# Patient Record
Sex: FEMALE | Race: WHITE | NOT HISPANIC OR LATINO | URBAN - METROPOLITAN AREA
[De-identification: names, ages, dates, MRNs, and addresses within clinical notes are randomized per-mention and may not be internally consistent; named-entity substitution may affect disease eponyms.]

---

## 2017-11-24 ENCOUNTER — OUTPATIENT (OUTPATIENT)
Dept: OUTPATIENT SERVICES | Facility: HOSPITAL | Age: 67
LOS: 1 days | Discharge: HOME | End: 2017-11-24

## 2017-11-24 DIAGNOSIS — M79.673 PAIN IN UNSPECIFIED FOOT: ICD-10-CM

## 2017-11-24 DIAGNOSIS — M54.5 LOW BACK PAIN: ICD-10-CM

## 2017-11-24 DIAGNOSIS — M79.643 PAIN IN UNSPECIFIED HAND: ICD-10-CM

## 2017-11-24 DIAGNOSIS — M25.579 PAIN IN UNSPECIFIED ANKLE AND JOINTS OF UNSPECIFIED FOOT: ICD-10-CM

## 2021-08-05 ENCOUNTER — EMERGENCY (EMERGENCY)
Facility: HOSPITAL | Age: 71
LOS: 0 days | Discharge: LEFT BEFORE DISCHARGE | End: 2021-08-05
Attending: EMERGENCY MEDICINE | Admitting: EMERGENCY MEDICINE
Payer: MEDICARE

## 2021-08-05 VITALS
HEART RATE: 82 BPM | TEMPERATURE: 98 F | DIASTOLIC BLOOD PRESSURE: 70 MMHG | OXYGEN SATURATION: 99 % | SYSTOLIC BLOOD PRESSURE: 136 MMHG | RESPIRATION RATE: 18 BRPM

## 2021-08-05 VITALS
HEART RATE: 79 BPM | OXYGEN SATURATION: 98 % | RESPIRATION RATE: 18 BRPM | SYSTOLIC BLOOD PRESSURE: 141 MMHG | TEMPERATURE: 97 F | DIASTOLIC BLOOD PRESSURE: 82 MMHG

## 2021-08-05 DIAGNOSIS — R11.0 NAUSEA: ICD-10-CM

## 2021-08-05 DIAGNOSIS — R07.89 OTHER CHEST PAIN: ICD-10-CM

## 2021-08-05 DIAGNOSIS — R20.8 OTHER DISTURBANCES OF SKIN SENSATION: ICD-10-CM

## 2021-08-05 DIAGNOSIS — I11.9 HYPERTENSIVE HEART DISEASE WITHOUT HEART FAILURE: ICD-10-CM

## 2021-08-05 DIAGNOSIS — Z87.39 PERSONAL HISTORY OF OTHER DISEASES OF THE MUSCULOSKELETAL SYSTEM AND CONNECTIVE TISSUE: ICD-10-CM

## 2021-08-05 DIAGNOSIS — J45.909 UNSPECIFIED ASTHMA, UNCOMPLICATED: ICD-10-CM

## 2021-08-05 DIAGNOSIS — M54.6 PAIN IN THORACIC SPINE: ICD-10-CM

## 2021-08-05 DIAGNOSIS — R07.9 CHEST PAIN, UNSPECIFIED: ICD-10-CM

## 2021-08-05 LAB
ALBUMIN SERPL ELPH-MCNC: 4.2 G/DL — SIGNIFICANT CHANGE UP (ref 3.5–5.2)
ALP SERPL-CCNC: 104 U/L — SIGNIFICANT CHANGE UP (ref 30–115)
ALT FLD-CCNC: 12 U/L — SIGNIFICANT CHANGE UP (ref 0–41)
ANION GAP SERPL CALC-SCNC: 10 MMOL/L — SIGNIFICANT CHANGE UP (ref 7–14)
ANISOCYTOSIS BLD QL: SIGNIFICANT CHANGE UP
AST SERPL-CCNC: 25 U/L — SIGNIFICANT CHANGE UP (ref 0–41)
BASE EXCESS BLDV CALC-SCNC: 3.7 MMOL/L — HIGH (ref -2–2)
BASOPHILS # BLD AUTO: 0.07 K/UL — SIGNIFICANT CHANGE UP (ref 0–0.2)
BASOPHILS NFR BLD AUTO: 0.9 % — SIGNIFICANT CHANGE UP (ref 0–1)
BILIRUB SERPL-MCNC: 0.5 MG/DL — SIGNIFICANT CHANGE UP (ref 0.2–1.2)
BUN SERPL-MCNC: 12 MG/DL — SIGNIFICANT CHANGE UP (ref 10–20)
CA-I SERPL-SCNC: 1.22 MMOL/L — SIGNIFICANT CHANGE UP (ref 1.12–1.3)
CALCIUM SERPL-MCNC: 9.6 MG/DL — SIGNIFICANT CHANGE UP (ref 8.5–10.1)
CHLORIDE SERPL-SCNC: 103 MMOL/L — SIGNIFICANT CHANGE UP (ref 98–110)
CO2 SERPL-SCNC: 24 MMOL/L — SIGNIFICANT CHANGE UP (ref 17–32)
CREAT SERPL-MCNC: 0.8 MG/DL — SIGNIFICANT CHANGE UP (ref 0.7–1.5)
D DIMER BLD IA.RAPID-MCNC: 148 NG/ML DDU — SIGNIFICANT CHANGE UP (ref 0–230)
EOSINOPHIL # BLD AUTO: 0.14 K/UL — SIGNIFICANT CHANGE UP (ref 0–0.7)
EOSINOPHIL NFR BLD AUTO: 1.7 % — SIGNIFICANT CHANGE UP (ref 0–8)
GAS PNL BLDV: 142 MMOL/L — SIGNIFICANT CHANGE UP (ref 136–145)
GAS PNL BLDV: SIGNIFICANT CHANGE UP
GIANT PLATELETS BLD QL SMEAR: PRESENT — SIGNIFICANT CHANGE UP
GLUCOSE SERPL-MCNC: 95 MG/DL — SIGNIFICANT CHANGE UP (ref 70–99)
HCO3 BLDV-SCNC: 30 MMOL/L — HIGH (ref 22–29)
HCT VFR BLD CALC: 36.1 % — LOW (ref 37–47)
HCT VFR BLDA CALC: 32.5 % — LOW (ref 34–44)
HGB BLD CALC-MCNC: 10.6 G/DL — LOW (ref 14–18)
HGB BLD-MCNC: 10.6 G/DL — LOW (ref 12–16)
HYPOCHROMIA BLD QL: SIGNIFICANT CHANGE UP
LACTATE BLDV-MCNC: 1.5 MMOL/L — SIGNIFICANT CHANGE UP (ref 0.5–1.6)
LYMPHOCYTES # BLD AUTO: 1.38 K/UL — SIGNIFICANT CHANGE UP (ref 1.2–3.4)
LYMPHOCYTES # BLD AUTO: 16.7 % — LOW (ref 20.5–51.1)
MCHC RBC-ENTMCNC: 19.2 PG — LOW (ref 27–31)
MCHC RBC-ENTMCNC: 29.4 G/DL — LOW (ref 32–37)
MCV RBC AUTO: 65.3 FL — LOW (ref 81–99)
MICROCYTES BLD QL: SIGNIFICANT CHANGE UP
MONOCYTES # BLD AUTO: 0.44 K/UL — SIGNIFICANT CHANGE UP (ref 0.1–0.6)
MONOCYTES NFR BLD AUTO: 5.3 % — SIGNIFICANT CHANGE UP (ref 1.7–9.3)
NEUTROPHILS # BLD AUTO: 6.23 K/UL — SIGNIFICANT CHANGE UP (ref 1.4–6.5)
NEUTROPHILS NFR BLD AUTO: 75.4 % — HIGH (ref 42.2–75.2)
NEUTS HYPERSEG # BLD: PRESENT — SIGNIFICANT CHANGE UP
NT-PROBNP SERPL-SCNC: 102 PG/ML — SIGNIFICANT CHANGE UP (ref 0–300)
OVALOCYTES BLD QL SMEAR: SLIGHT — SIGNIFICANT CHANGE UP
PCO2 BLDV: 54 MMHG — HIGH (ref 41–51)
PH BLDV: 7.36 — SIGNIFICANT CHANGE UP (ref 7.26–7.43)
PLAT MORPH BLD: NORMAL — SIGNIFICANT CHANGE UP
PLATELET # BLD AUTO: 313 K/UL — SIGNIFICANT CHANGE UP (ref 130–400)
PO2 BLDV: 29 MMHG — SIGNIFICANT CHANGE UP (ref 20–40)
POIKILOCYTOSIS BLD QL AUTO: SLIGHT — SIGNIFICANT CHANGE UP
POLYCHROMASIA BLD QL SMEAR: SIGNIFICANT CHANGE UP
POTASSIUM BLDV-SCNC: 4.5 MMOL/L — SIGNIFICANT CHANGE UP (ref 3.3–5.6)
POTASSIUM SERPL-MCNC: 5.1 MMOL/L — HIGH (ref 3.5–5)
POTASSIUM SERPL-SCNC: 5.1 MMOL/L — HIGH (ref 3.5–5)
PROT SERPL-MCNC: 7.1 G/DL — SIGNIFICANT CHANGE UP (ref 6–8)
RBC # BLD: 5.53 M/UL — HIGH (ref 4.2–5.4)
RBC # FLD: 15.7 % — HIGH (ref 11.5–14.5)
RBC BLD AUTO: ABNORMAL
SAO2 % BLDV: 50 % — SIGNIFICANT CHANGE UP
SODIUM SERPL-SCNC: 137 MMOL/L — SIGNIFICANT CHANGE UP (ref 135–146)
TROPONIN T SERPL-MCNC: <0.01 NG/ML — SIGNIFICANT CHANGE UP
WBC # BLD: 8.26 K/UL — SIGNIFICANT CHANGE UP (ref 4.8–10.8)
WBC # FLD AUTO: 8.26 K/UL — SIGNIFICANT CHANGE UP (ref 4.8–10.8)

## 2021-08-05 PROCEDURE — 93970 EXTREMITY STUDY: CPT | Mod: 26

## 2021-08-05 PROCEDURE — 93010 ELECTROCARDIOGRAM REPORT: CPT

## 2021-08-05 PROCEDURE — 99285 EMERGENCY DEPT VISIT HI MDM: CPT

## 2021-08-05 PROCEDURE — 71045 X-RAY EXAM CHEST 1 VIEW: CPT | Mod: 26

## 2021-08-05 NOTE — ED ADULT NURSE NOTE - OBJECTIVE STATEMENT
Patient stated she bent down this morning and had sudden onset of left sided cp that radiated down left arm . Patient denies and Cough SOB N/V

## 2021-08-05 NOTE — ED PROVIDER NOTE - CLINICAL SUMMARY MEDICAL DECISION MAKING FREE TEXT BOX
The patient wishes to leave against medical advice.  I have discussed the risks, benefits and alternatives (including the possibility of worsening of disease, pain, permanent disability, and/or death) with the patient and his/her family (if available).  The patient voices understanding of these risks, benefits, and alternatives and still wishes to sign out against medical advice.  The patient is awake, alert, oriented  x 3 and has demonstrated capacity to refuse/direct care.  I have advised the patient that they can and should return immediately should they develop any worse/different/additional symptoms, or if they change their mind and want to continue their care.  Shared decision making utilized and HEART score discussed.  Patient electing for outpatient management and will follow up immediately with cardiology.  Risk of MACE discussed and patient understands this risk. Strict return precautions discussed and patient aware they can return at any time for re-evaluation and possible admission. EKG and results discussed.

## 2021-08-05 NOTE — ED PROVIDER NOTE - PROGRESS NOTE DETAILS
ATTENDING NOTE: I personally evaluated the patient. I reviewed the Resident’s note (as assigned above), and agree with the findings and plan except as documented in my note.   70 y/o F with PMH of HTN, cardiomegaly,  asthma, osteoarthritis, rheumatoid arthritis, p/w CP; notes pain in upper back radiating to L chest, beginning at 8AM this morning while sitting, persistent CP in ED. Prior to onset, went out to get the newspaper then sat down to watch TV before developing the pain. Pt then had burning down the L arm and up L side of neck so called EMS. Notes nausea this AM when waking up which has since resolved. Denies DVT workup in the past for chronic RLE swelling. No family HX of DVTs, PEs. Had a nuclear stress test 10 years ago in [facility]. Pt has a colonoscopy scheduled. No fever, chills, n/v, sob, pleuritic cp, palpitations, diaphoresis, cough, ha/lh/dizziness, numbness/tingling, neck pain/ stiffness, abd pain, diarrhea, constipation, melena/brbpr, urinary symptoms, trauma, weakness, edema, calf pain/swelling/erythema, sick contacts, recent travel or rash.   Vital Signs: I have reviewed the initial vital signs. Constitutional: Non-toxic appearing pt sitting on stretcher in nad. Integumentary: No rash. ENT: MMM NECK: Supple, non-tender, no meningeal signs. Cardiovascular: RRR, radial pulses 2/4 b/l. No JVD. (+) Reproducible L chest wall TTP.Respiratory: BS present b/l, ctabl, no wheezing or crackles, good resp effort and excursion, good air exchange,  no accessory muscle use, no stridor. Gastrointestinal: BS present throughout all 4 quadrants, soft, nd, nt, no rebound tenderness or guarding, no cvat. Musculoskeletal: FROM, circumference of R leg >L leg, no calf pain/swelling/erythema. Neurologic: AAOx3, motor 5/5 and sensation intact throughout UE and LE ext, CN II-XII intact, No facial droop or slurring of speech. No focal deficits.  Plan for labs, EKG, CXR, US LE, reassess. ATTENDING NOTE: I personally evaluated the patient. I reviewed the Resident’s note (as assigned above), and agree with the findings and plan except as documented in my note.   72 y/o F with PMH of HTN, cardiomegaly,  asthma, osteoarthritis, rheumatoid arthritis, p/w CP; notes pain in upper back radiating to L chest, beginning at 8AM this morning while sitting, persistent CP in ED. Prior to onset, went out to get the newspaper then sat down to watch TV before developing the pain. Pt then had burning down the L arm so called EMS. Notes nausea this AM when waking up which has since resolved. Denies DVT workup in the past for chronic RLE swelling. No family HX of DVTs, PEs. Had a nuclear stress test 10 years ago in [facility]. Pt has a colonoscopy scheduled. Does not have a cardiologist. No fever, chills, n/v, sob, pleuritic cp, palpitations, diaphoresis, cough, ha/lh/dizziness, numbness/tingling, neck pain/ stiffness, abd pain, diarrhea, constipation, melena/brbpr, urinary symptoms, trauma, weakness, calf pain/swelling/erythema, sick contacts, recent travel or rash.   Vital Signs: I have reviewed the initial vital signs. Constitutional: Non-toxic appearing pt sitting on stretcher in nad. Integumentary: No rash. ENT: MMM NECK: Supple, non-tender, no meningeal signs. Cardiovascular: RRR, radial pulses 2/4 b/l. No JVD. (+) Reproducible L chest wall TTP.Respiratory: BS present b/l, ctabl, no wheezing or crackles, no accessory muscle use, no stridor. Gastrointestinal: BS present throughout all 4 quadrants, soft, nd, nt, no rebound tenderness or guarding, no cvat. Musculoskeletal: FROM, circumference of R leg >L leg, no calf pain/swelling/erythema. dp and pt pulses 2/4 b/l. Neurologic: AAOx3, motor 5/5 and sensation intact throughout UE and LE ext, CN II-XII intact, No facial droop or slurring of speech. No focal deficits.  Plan for labs, EKG, CXR, US LE, reassess. ED Attending VAN Colin  prelim duplex negative for DVT ED Attending VAN Colin  results reviewed with pt and son at bedside, discussed obs, pt and son report pt comfortable with no symptoms at this time and would like to follow up as outpt, understands risks  associated and able to recite them back, would like to follow up with pmd, cardio and her RA physician as out, copy of results provided, adamant about signing out ama, pt aaox3, competent, does not lack capacity, aware of signs and symptoms to return for, will follow up as discussed. Og : I had extensive discussion of risks and benefits of pursuing further cardiac workup with patient and any son Gaby Deal at bedside .  patient still electing to leave against medical advice. She is awake, alert, oriented and demonstrates full capacity and insight into illness. She is aware and encouraged to return immediately to ED or nearest ED if she decides to change mind regarding care or if patient experiences any new, worsening, or concerning symptoms. PAtient reports she will follow up with a cardiologist outpatient. Og : preliminary DVT study negative.

## 2021-08-05 NOTE — ED ADULT NURSE NOTE - NSIMPLEMENTINTERV_GEN_ALL_ED
Implemented All Fall with Harm Risk Interventions:  [place] to call system. Call bell, personal items and telephone within reach. Instruct patient to call for assistance. Room bathroom lighting operational. Non-slip footwear when patient is off stretcher. Physically safe environment: no spills, clutter or unnecessary equipment. Stretcher in lowest position, wheels locked, appropriate side rails in place. Provide visual cue, wrist band, yellow gown, etc. Monitor gait and stability. Monitor for mental status changes and reorient to person, place, and time. Review medications for side effects contributing to fall risk. Reinforce activity limits and safety measures with patient and family. Provide visual clues: red socks.

## 2021-08-05 NOTE — ED PROVIDER NOTE - PATIENT PORTAL LINK FT
You can access the FollowMyHealth Patient Portal offered by [facility] by registering at the following website: [email or web address]. By joining [employer]’s FollowMyHealth portal, you will also be able to view your health information using other applications (apps) compatible with our system.

## 2021-08-05 NOTE — ED PROVIDER NOTE - CARE PROVIDER_API CALL
Tanja Johnson)  Cardiovascular Disease; Internal Medicine  [address] [place] [address]  Phone: [phone]  Fax: [phone]  Follow Up Time: 1-3 Days

## 2021-08-05 NOTE — ED PROVIDER NOTE - OBJECTIVE STATEMENT
Pt is a 70 y/o F with PMH of HTN, cardiomegaly,  asthma, osteoarthritis, rheumatoid arthritis presenting w/ chest pain that started this AM @ 8am. Pt reports it started as a sharp pain in upper back radiating to L chest while sitting. Priot to this pt went out to get the newspaper then sat down in front of the TV whent he pain started . Pt then developed a burning sensation down the L arm so EMA was called. . Pt reports she had a nuclear stress test 10 years ago in [facility], which was normal. Denies a cardiologist. No SOb, no syncope.

## 2021-08-05 NOTE — ED PROVIDER NOTE - PHYSICAL EXAMINATION
CONSTITUTIONAL: Well-developed; well-nourished; in no acute distress.   SKIN: warm, dry  HEAD: Normocephalic; atraumatic.  EYES: PERRL, EOMI, normal sclera and conjunctiva   ENT: No nasal discharge; airway clear.  NECK: Supple; non tender.  CARD:  Regular rate and rhythm. no overlying skin changes , + mild TTP over L chest wall   RESP: NO inc WOB   ABD: soft ntnd  EXT: LLE > RLE - pt reports this is chronic     LYMPH: No acute cervical adenopathy.  NEURO: Alert, oriented, grossly unremarkable  PSYCH: Cooperative, appropriate.

## 2021-08-23 ENCOUNTER — OUTPATIENT (OUTPATIENT)
Dept: OUTPATIENT SERVICES | Facility: HOSPITAL | Age: 71
LOS: 1 days | Discharge: HOME | End: 2021-08-23

## 2021-08-23 DIAGNOSIS — Z11.59 ENCOUNTER FOR SCREENING FOR OTHER VIRAL DISEASES: ICD-10-CM

## 2021-08-26 ENCOUNTER — OUTPATIENT (OUTPATIENT)
Dept: OUTPATIENT SERVICES | Facility: HOSPITAL | Age: 71
LOS: 1 days | Discharge: HOME | End: 2021-08-26
Payer: MEDICARE

## 2021-08-26 VITALS
HEART RATE: 80 BPM | HEIGHT: 65 IN | WEIGHT: 244.93 LBS | TEMPERATURE: 97 F | SYSTOLIC BLOOD PRESSURE: 186 MMHG | DIASTOLIC BLOOD PRESSURE: 82 MMHG | RESPIRATION RATE: 20 BRPM

## 2021-08-26 VITALS — SYSTOLIC BLOOD PRESSURE: 153 MMHG | HEART RATE: 76 BPM | RESPIRATION RATE: 18 BRPM | DIASTOLIC BLOOD PRESSURE: 76 MMHG

## 2021-08-26 DIAGNOSIS — D21.9 BENIGN NEOPLASM OF CONNECTIVE AND OTHER SOFT TISSUE, UNSPECIFIED: Chronic | ICD-10-CM

## 2021-08-26 DIAGNOSIS — Z98.49 CATARACT EXTRACTION STATUS, UNSPECIFIED EYE: Chronic | ICD-10-CM

## 2021-08-26 PROCEDURE — 88305 TISSUE EXAM BY PATHOLOGIST: CPT | Mod: 26

## 2021-08-26 RX ORDER — ALBUTEROL 90 UG/1
0 AEROSOL, METERED ORAL
Qty: 0 | Refills: 0 | DISCHARGE

## 2021-08-26 RX ORDER — AMLODIPINE BESYLATE 2.5 MG/1
1 TABLET ORAL
Qty: 0 | Refills: 0 | DISCHARGE

## 2021-08-26 RX ORDER — LOSARTAN POTASSIUM 100 MG/1
1 TABLET, FILM COATED ORAL
Qty: 0 | Refills: 0 | DISCHARGE

## 2021-08-26 RX ORDER — ERGOCALCIFEROL 1.25 MG/1
1 CAPSULE ORAL
Qty: 0 | Refills: 0 | DISCHARGE

## 2021-08-27 LAB — SURGICAL PATHOLOGY STUDY: SIGNIFICANT CHANGE UP

## 2021-08-31 DIAGNOSIS — Z91.012 ALLERGY TO EGGS: ICD-10-CM

## 2021-08-31 DIAGNOSIS — M06.9 RHEUMATOID ARTHRITIS, UNSPECIFIED: ICD-10-CM

## 2021-08-31 DIAGNOSIS — I10 ESSENTIAL (PRIMARY) HYPERTENSION: ICD-10-CM

## 2021-08-31 DIAGNOSIS — Z88.1 ALLERGY STATUS TO OTHER ANTIBIOTIC AGENTS STATUS: ICD-10-CM

## 2021-08-31 DIAGNOSIS — J45.909 UNSPECIFIED ASTHMA, UNCOMPLICATED: ICD-10-CM

## 2021-08-31 DIAGNOSIS — Z12.11 ENCOUNTER FOR SCREENING FOR MALIGNANT NEOPLASM OF COLON: ICD-10-CM

## 2021-08-31 DIAGNOSIS — D12.2 BENIGN NEOPLASM OF ASCENDING COLON: ICD-10-CM

## 2021-08-31 DIAGNOSIS — Z88.0 ALLERGY STATUS TO PENICILLIN: ICD-10-CM

## 2021-08-31 DIAGNOSIS — Z88.9 ALLERGY STATUS TO UNSPECIFIED DRUGS, MEDICAMENTS AND BIOLOGICAL SUBSTANCES: ICD-10-CM

## 2021-08-31 DIAGNOSIS — K64.8 OTHER HEMORRHOIDS: ICD-10-CM

## 2021-08-31 DIAGNOSIS — Z88.2 ALLERGY STATUS TO SULFONAMIDES: ICD-10-CM

## 2021-08-31 DIAGNOSIS — D64.9 ANEMIA, UNSPECIFIED: ICD-10-CM

## 2021-08-31 DIAGNOSIS — D12.0 BENIGN NEOPLASM OF CECUM: ICD-10-CM

## 2021-08-31 DIAGNOSIS — K57.30 DIVERTICULOSIS OF LARGE INTESTINE WITHOUT PERFORATION OR ABSCESS WITHOUT BLEEDING: ICD-10-CM

## 2021-08-31 DIAGNOSIS — Z91.013 ALLERGY TO SEAFOOD: ICD-10-CM

## 2021-08-31 DIAGNOSIS — Z96.1 PRESENCE OF INTRAOCULAR LENS: ICD-10-CM

## 2022-02-14 NOTE — PRE-ANESTHESIA EVALUATION ADULT - RESPIRATORY RATE (BREATHS/MIN)
Patient would like a referral to PT faxed [facility] to [facility].    Fax number: [phone]   20

## 2022-04-05 ENCOUNTER — EMERGENCY (EMERGENCY)
Facility: HOSPITAL | Age: 72
LOS: 0 days | Discharge: HOME | End: 2022-04-05
Attending: EMERGENCY MEDICINE | Admitting: EMERGENCY MEDICINE
Payer: MEDICARE

## 2022-04-05 VITALS
HEIGHT: 65 IN | OXYGEN SATURATION: 93 % | DIASTOLIC BLOOD PRESSURE: 71 MMHG | WEIGHT: 240.08 LBS | TEMPERATURE: 101 F | SYSTOLIC BLOOD PRESSURE: 144 MMHG | HEART RATE: 102 BPM | RESPIRATION RATE: 22 BRPM

## 2022-04-05 VITALS — RESPIRATION RATE: 18 BRPM | OXYGEN SATURATION: 99 % | HEART RATE: 89 BPM

## 2022-04-05 DIAGNOSIS — Z88.0 ALLERGY STATUS TO PENICILLIN: ICD-10-CM

## 2022-04-05 DIAGNOSIS — I10 ESSENTIAL (PRIMARY) HYPERTENSION: ICD-10-CM

## 2022-04-05 DIAGNOSIS — D21.9 BENIGN NEOPLASM OF CONNECTIVE AND OTHER SOFT TISSUE, UNSPECIFIED: Chronic | ICD-10-CM

## 2022-04-05 DIAGNOSIS — M06.9 RHEUMATOID ARTHRITIS, UNSPECIFIED: ICD-10-CM

## 2022-04-05 DIAGNOSIS — R05.9 COUGH, UNSPECIFIED: ICD-10-CM

## 2022-04-05 DIAGNOSIS — D64.9 ANEMIA, UNSPECIFIED: ICD-10-CM

## 2022-04-05 DIAGNOSIS — Z88.2 ALLERGY STATUS TO SULFONAMIDES: ICD-10-CM

## 2022-04-05 DIAGNOSIS — R06.02 SHORTNESS OF BREATH: ICD-10-CM

## 2022-04-05 DIAGNOSIS — Z98.49 CATARACT EXTRACTION STATUS, UNSPECIFIED EYE: Chronic | ICD-10-CM

## 2022-04-05 DIAGNOSIS — Z91.012 ALLERGY TO EGGS: ICD-10-CM

## 2022-04-05 DIAGNOSIS — J45.901 UNSPECIFIED ASTHMA WITH (ACUTE) EXACERBATION: ICD-10-CM

## 2022-04-05 DIAGNOSIS — Z91.013 ALLERGY TO SEAFOOD: ICD-10-CM

## 2022-04-05 DIAGNOSIS — Z88.8 ALLERGY STATUS TO OTHER DRUGS, MEDICAMENTS AND BIOLOGICAL SUBSTANCES STATUS: ICD-10-CM

## 2022-04-05 DIAGNOSIS — U07.1 COVID-19: ICD-10-CM

## 2022-04-05 DIAGNOSIS — R07.0 PAIN IN THROAT: ICD-10-CM

## 2022-04-05 DIAGNOSIS — Z88.6 ALLERGY STATUS TO ANALGESIC AGENT: ICD-10-CM

## 2022-04-05 DIAGNOSIS — Z98.49 CATARACT EXTRACTION STATUS, UNSPECIFIED EYE: ICD-10-CM

## 2022-04-05 DIAGNOSIS — Z87.42 PERSONAL HISTORY OF OTHER DISEASES OF THE FEMALE GENITAL TRACT: ICD-10-CM

## 2022-04-05 DIAGNOSIS — Z88.1 ALLERGY STATUS TO OTHER ANTIBIOTIC AGENTS STATUS: ICD-10-CM

## 2022-04-05 PROBLEM — J45.909 UNSPECIFIED ASTHMA, UNCOMPLICATED: Chronic | Status: ACTIVE | Noted: 2021-08-26

## 2022-04-05 LAB
ALBUMIN SERPL ELPH-MCNC: 4.5 G/DL — SIGNIFICANT CHANGE UP (ref 3.5–5.2)
ALP SERPL-CCNC: 85 U/L — SIGNIFICANT CHANGE UP (ref 30–115)
ALT FLD-CCNC: 12 U/L — SIGNIFICANT CHANGE UP (ref 0–41)
ANION GAP SERPL CALC-SCNC: 13 MMOL/L — SIGNIFICANT CHANGE UP (ref 7–14)
AST SERPL-CCNC: 24 U/L — SIGNIFICANT CHANGE UP (ref 0–41)
BASOPHILS # BLD AUTO: 0.03 K/UL — SIGNIFICANT CHANGE UP (ref 0–0.2)
BASOPHILS NFR BLD AUTO: 0.3 % — SIGNIFICANT CHANGE UP (ref 0–1)
BILIRUB SERPL-MCNC: 0.5 MG/DL — SIGNIFICANT CHANGE UP (ref 0.2–1.2)
BUN SERPL-MCNC: 10 MG/DL — SIGNIFICANT CHANGE UP (ref 10–20)
CALCIUM SERPL-MCNC: 8.9 MG/DL — SIGNIFICANT CHANGE UP (ref 8.5–10.1)
CHLORIDE SERPL-SCNC: 97 MMOL/L — LOW (ref 98–110)
CO2 SERPL-SCNC: 24 MMOL/L — SIGNIFICANT CHANGE UP (ref 17–32)
CREAT SERPL-MCNC: 0.8 MG/DL — SIGNIFICANT CHANGE UP (ref 0.7–1.5)
EGFR: 79 ML/MIN/1.73M2 — SIGNIFICANT CHANGE UP
EOSINOPHIL # BLD AUTO: 0 K/UL — SIGNIFICANT CHANGE UP (ref 0–0.7)
EOSINOPHIL NFR BLD AUTO: 0 % — SIGNIFICANT CHANGE UP (ref 0–8)
GLUCOSE SERPL-MCNC: 125 MG/DL — HIGH (ref 70–99)
HCT VFR BLD CALC: 34.3 % — LOW (ref 37–47)
HGB BLD-MCNC: 10.3 G/DL — LOW (ref 12–16)
IMM GRANULOCYTES NFR BLD AUTO: 0.4 % — HIGH (ref 0.1–0.3)
LYMPHOCYTES # BLD AUTO: 0.93 K/UL — LOW (ref 1.2–3.4)
LYMPHOCYTES # BLD AUTO: 8.1 % — LOW (ref 20.5–51.1)
MCHC RBC-ENTMCNC: 19 PG — LOW (ref 27–31)
MCHC RBC-ENTMCNC: 30 G/DL — LOW (ref 32–37)
MCV RBC AUTO: 63.2 FL — LOW (ref 81–99)
MONOCYTES # BLD AUTO: 1.03 K/UL — HIGH (ref 0.1–0.6)
MONOCYTES NFR BLD AUTO: 9 % — SIGNIFICANT CHANGE UP (ref 1.7–9.3)
NEUTROPHILS # BLD AUTO: 9.41 K/UL — HIGH (ref 1.4–6.5)
NEUTROPHILS NFR BLD AUTO: 82.2 % — HIGH (ref 42.2–75.2)
NRBC # BLD: 0 /100 WBCS — SIGNIFICANT CHANGE UP (ref 0–0)
PLATELET # BLD AUTO: 281 K/UL — SIGNIFICANT CHANGE UP (ref 130–400)
POTASSIUM SERPL-MCNC: 4.5 MMOL/L — SIGNIFICANT CHANGE UP (ref 3.5–5)
POTASSIUM SERPL-SCNC: 4.5 MMOL/L — SIGNIFICANT CHANGE UP (ref 3.5–5)
PROT SERPL-MCNC: 7.1 G/DL — SIGNIFICANT CHANGE UP (ref 6–8)
RBC # BLD: 5.43 M/UL — HIGH (ref 4.2–5.4)
RBC # FLD: 15.6 % — HIGH (ref 11.5–14.5)
SARS-COV-2 RNA SPEC QL NAA+PROBE: DETECTED
SODIUM SERPL-SCNC: 134 MMOL/L — LOW (ref 135–146)
WBC # BLD: 11.45 K/UL — HIGH (ref 4.8–10.8)
WBC # FLD AUTO: 11.45 K/UL — HIGH (ref 4.8–10.8)

## 2022-04-05 PROCEDURE — 99285 EMERGENCY DEPT VISIT HI MDM: CPT

## 2022-04-05 PROCEDURE — 71045 X-RAY EXAM CHEST 1 VIEW: CPT | Mod: 26

## 2022-04-05 RX ORDER — IBUPROFEN 200 MG
600 TABLET ORAL ONCE
Refills: 0 | Status: COMPLETED | OUTPATIENT
Start: 2022-04-05 | End: 2022-04-05

## 2022-04-05 RX ORDER — IPRATROPIUM/ALBUTEROL SULFATE 18-103MCG
3 AEROSOL WITH ADAPTER (GRAM) INHALATION
Refills: 0 | Status: DISCONTINUED | OUTPATIENT
Start: 2022-04-05 | End: 2022-04-05

## 2022-04-05 RX ADMIN — Medication 600 MILLIGRAM(S): at 08:27

## 2022-04-05 RX ADMIN — Medication 3 MILLILITER(S): at 08:28

## 2022-04-05 RX ADMIN — Medication 125 MILLIGRAM(S): at 08:28

## 2022-04-05 NOTE — ED PROVIDER NOTE - OBJECTIVE STATEMENT
4-day history of cough laryngitis symptoms are moderate intensity not associated with shortness of breath aggravated by talking.  Mild alleviation with albuterol but symptoms persist.  She denies any chest pain increased leg swelling orthopnea. Patient had + covid test 2 days ago.

## 2022-04-05 NOTE — ED PROVIDER NOTE - NS ED ROS FT
Constitutional:  no fevers, no chills, no malaise  Eyes:  No visual changes  ENMT: +throat pain  Cardiac:  No chest pain  Respiratory:  +Cough  GI:  No nausea, vomiting, diarrhea or abdominal pain.  :  No dysuria, frequency or burning.  MS:  No back pain, no joint pain.  Neuro:  No headache, no dizziness, no change in mental status  Skin:  No skin rash  Except as documented in the HPI,  all other systems are negative

## 2022-04-05 NOTE — ED PROVIDER NOTE - NSFOLLOWUPINSTRUCTIONS_ED_ALL_ED_FT
Follow up with PMD and COVID clinic in 1-2 days.    Novel Coronavirus (COVID-19)  The Facts  What is a coronavirus?  Coronaviruses are a large family of viruses that cause illnesses ranging from the common cold  to more severe diseases such as Middle East Respiratory Syndrome (MERS) and Severe Acute  Respiratory Syndrome (SARS).  What is Novel Coronavirus (COVID-19)?  COVID-19 is a new strain of Coronavirus that has not been previously identified in humans. COVID-19  was identified in [place], [place], [place] in December 2019 (COVID-19). COVID-19 has  since been identified outside of [place], in a growing number of countries internationally, including  the [place].  Where can I find the most recent information about COVID-19?  The Centers for Disease Control and Prevention (CDC) is closely monitoring the outbreak caused by the  COVID-19. For the latest information about COVID- 19, visit the CDC website at  https://www.cdc.gov/coronavirus/index.html  How are coronaviruses spread?  Coronaviruses can be transmitted from person-to- person, usually after close contact with an infected person,  for example, in a household, workplace, or healthcare setting via droplets that become airborne after a cough  or sneeze by an affected person. These droplets can then infect a nearby person. It is likely transmission also  occurs by touching recently contaminated surfaces.  What are the symptoms of coronavirus infection?  It depends on the virus, but common signs include fever and/or respiratory symptoms such as  cough and shortness of breath. In more severe cases, infection can cause pneumonia, severe acute  respiratory syndrome, kidney failure and even death. Fortunately, most cases of COVID-19 have an  illness no different than the influenza “flu”. With a majority of these patients having mild symptoms  and overall mortality which appears to be not much different than the flu.  Is there a treatment for a COVID-19?  There is no specific treatment for disease caused by COVID-19. However, many of the symptoms can  be treated based on the patient’s clinical condition. Supportive care for infected persons can be highly  effective.  What can I do to protect myself?  Washing your hands, covering your cough, and disinfecting surfaces are the best precautionary  measures. It is also advisable to avoid close contact with anyone showing symptoms of respiratory  illness such as coughing and sneezing. Those with symptoms should wear a surgical mask when  around others.  What can I do to protect those around me?  If you have been identified as someone who may be infected with COVID-19, we recommend you  follow the self-isolation procedures outlined below to protect those around you and limit the spread  of this virus.   March 3, 2020  Recommendations for Patients Advised to Self-Isolate  for Possible COVID-19 Exposure  We recommend the below precautionary steps from now until 14 days from when you  returned from your travel or date of your last known possible contact:  - Do not go to work, school, or public areas. Avoid using public transportation, ride-sharing, or  taxis.  - As much as possible, separate yourself from other people in your home. If you can, you should  stay in a room and away from other people in your home. Also, you should use a separate  bathroom, if available.  - Wear the supplied mask whenever you are around other people.  - If you have a non-urgent medical appointment, please reschedule for a later date. If the  appointment is urgent, please call the healthcare provider and tell them that you are on selfisolation for possible COVID-19. This will help the healthcare provider’s office take steps to keep  other people from getting infected or exposed. If you can reschedule routine appointments, do  so.  - Wash your hands often with soap and water for at least 15 to 20 seconds or clean your hands  with an alcohol-based hand  that contains 60 to 95% alcohol, covering all surfaces of  your hands and rubbing them together until they feel dry. Soap and water should be used  preferentially if hands are visibly dirty.  - Cover your mouth and nose with a tissue when you cough or sneeze. Throw used tissues in a  lined trash can; immediately wash your hands.  - Avoid touching your eyes, nose, and mouth with your hands.  - Avoid sharing personal household items. You should not share dishes, drinking glasses, cups,  eating utensils, towels, or bedding with other people or pets in your home. After using these  items, they should be washed thoroughly with soap and water.  - Clean and disinfect all “high-touch” surfaces every day. High touch surfaces include counters,  tabletops, doorknobs, light switches, remote controls, bathroom fixtures, toilets, phones,  keyboards, tablets, and bedside tables. Also, clean any surfaces that may have blood, stool, or  body fluids on them.       If you develop worsening symptoms:  - If you develop worsening symptoms, such as severe shortness of breath, please call [phone] option #9. They will assist you in determining your next steps.  During your time on self-isolation do the following:  - Work from home if you are able to so.  - Limit social isolation by talking with friends and family on the phone or with face-time  - Talk with friends and relatives who don’t live with you about supporting each other if one  household has to be quarantined. For example, agree to drop groceries or other supplies at the  front door.  - Exercise and spend time outdoors away from others if able to do so.    Why didn’t I get tested for novel coronavirus (COVID-19)?  The number of available tests is very limited so strict rules exist for who is allowed to be tested.  [facility] has been authorized to perform testing and is currently working hard to be  able to start providing the test. Such testing is currently reserved for patients who have had  contact with someone infected with the virus, or those who are very sick a plus those who have  traveled to areas identified by the Centers for Disease Control and Prevention (CD) and will  require hospitalization.  What should I do now?  If you are well enough to be discharged home and are not in a high risk group to have  contracted the COVID-10, you should care for yourself at home exactly like you would if you  have Influenza “flu”. Follow all the standard guidelines about washing your hands, covering  your cough, etc.  You should return to the Emergency Department if you develop worse symptoms, trouble  breathing, chest pain, and/or a fever that doesn’t improve with over the counter  acetaminophen or ibuprofen.

## 2022-04-05 NOTE — ED PROVIDER NOTE - CLINICAL SUMMARY MEDICAL DECISION MAKING FREE TEXT BOX
evaluated for cough in the setting of covid and asthma. pulse ox was stable, given nebs and steroids with improvement of sytmps. cxr was nml. patient discharged in stable conditions and discused indications to return to the ED

## 2022-04-05 NOTE — ED ADULT TRIAGE NOTE - CHIEF COMPLAINT QUOTE
patient reports being covid + since sunday reports difficulty breathing. lung sounds clear patient noted to have bark like cough - patient using nebulizer at home with out relief

## 2022-04-05 NOTE — ED PROVIDER NOTE - PATIENT PORTAL LINK FT
You can access the FollowMyHealth Patient Portal offered by [facility] by registering at the following website: [email or web address]. By joining [employer]’s FollowMyHealth portal, you will also be able to view your health information using other applications (apps) compatible with our system.

## 2022-04-05 NOTE — ED ADULT NURSE NOTE - OBJECTIVE STATEMENT
pt came to ed for difficult breathing, + covid since sunday. has used nebulizer at home with no relief

## 2022-04-05 NOTE — ED PROVIDER NOTE - NS ED ATTENDING STATEMENT MOD
This was a shared visit with the JULIANNE. I reviewed and verified the documentation and independently performed the documented:

## 2022-04-05 NOTE — ED PROVIDER NOTE - PHYSICAL EXAMINATION
CONSTITUTIONAL: Well-developed; well-nourished; in no acute distress, nontoxic appearing  SKIN: skin exam is warm and dry,  HEAD: Normocephalic; atraumatic.  EYES: PERRL, 3 mm bilateral, no nystagmus, EOM intact; conjunctiva and sclera clear.  ENT: MMM, no nasal congestion  NECK: Supple; non tender.  ROM intact.  CARD: S1, S2 normal, no murmur  RESP: speaking full sentences however there is end expiratory wheezing bilaterally  ABD: soft; non-distended; non-tender. No Rebound, No guarding  EXT: 2+ edema in bilateral lower xt  NEURO: awake, alert, following commands, oriented, grossly unremarkable. No Focal deficits. GCS 15.   PSYCH: Cooperative, appropriate.

## 2022-04-05 NOTE — ED ADULT NURSE NOTE - NSIMPLEMENTINTERV_GEN_ALL_ED
Implemented All Fall with Harm Risk Interventions:  [place] to call system. Call bell, personal items and telephone within reach. Instruct patient to call for assistance. Room bathroom lighting operational. Non-slip footwear when patient is off stretcher. Physically safe environment: no spills, clutter or unnecessary equipment. Stretcher in lowest position, wheels locked, appropriate side rails in place. Provide visual cue, wrist band, yellow gown, etc. Monitor gait and stability. Monitor for mental status changes and reorient to person, place, and time. Review medications for side effects contributing to fall risk. Reinforce activity limits and safety measures with patient and family. Provide visual clues: red socks.

## 2022-04-05 NOTE — ED PROVIDER NOTE - NSFOLLOWUPCLINICS_GEN_ALL_ED_FT
[facility] [place] [address]  Phone: [phone]  Fax:

## 2022-04-05 NOTE — ED PROVIDER NOTE - ATTENDING CONTRIBUTION TO CARE
4-day history of cough in the setting of positive Covid test patient has a history of asthma symptoms with mild improvement with albuterol but not resolving.  On exam there is faint end expiratory wheezing bilaterally she speaking full sentences there is no tachypnea or respiratory distress.  Her resting pulse ox is 97 and with exertion 97 plan is to obtain chest x-ray lab work start on steroids given wheezing and DuoNebs

## 2022-04-05 NOTE — ED ADULT NURSE NOTE - CHPI ED NUR SYMPTOMS POS
BRIEF OPERATIVE NOTE    Date of Procedure: 11/4/2019   Preoperative Diagnosis: CHOLECYSTITIS  Postoperative Diagnosis: CHOLECYSTITIS    Procedure(s):  CHOLECYSTECTOMY LAPAROSCOPIC  Surgeon(s) and Role:     Wili See MD - Primary         Surgical Staff:  Circ-1: Manjinder Kaufman RN  Scrub Tech-1: Kimmy Alfaro  Surg Asst-1: Micah LORA  Event Time In Time Out   Incision Start 1724    Incision Close       Anesthesia: General   Estimated Blood Loss: 10cc  Specimens:   ID Type Source Tests Collected by Time Destination   1 : Gallbladder Fresh Abdomen  Mona Mcknight MD 11/4/2019 9673 Pathology      Findings: inflamed distended GB   Complications: none  Implants: * No implants in log * SHORTNESS OF BREATH

## 2022-06-30 NOTE — ED ADULT TRIAGE NOTE - BMI (KG/M2)
06/30/22      Dale Wong  [address] [place] [id number]    Dear Dale,    We look forward to seeing you on 7/21/22 at 11:00 with a 10:00 arrival for your procedure.    To better prepare you, please observe the following:     Preparing for GI Procedure    ? There is no need to schedule an appointment with your primary care physician      You will receive a call from our Pre-Admission Testing department prior to your surgical procedure. This call is necessary to get important information about your health history, to ensure you are properly prepared for surgery, and minimize the chance of having your procedure delayed or rescheduled.    WHAT TO DO ABOUT YOUR PRESCRIPTION MEDICATIONS  You must continue taking all your previously prescribed medications as directed unless specifically told not to by your doctor, or if you find them on the list below    STOP THESE MEDICATIONS   • Aspirin stop 3 days prior unless instructed otherwise by your doctor or Cardiologist.   • Erectile dysfunction medications stop 2 days prior    • Herbal medications/ Vitamins/ Fish Oil stop 7 days prior unless otherwise directed by your doctor.    • Phentermine stop 7 days prior   • Selegiline patches stop 21 days prior   Your doctor will have given you instructions about modifying your Insulin regimen 1 day prior.  If on a blood thinner, ask your doctor, Cardiologist and/or surgeon if and when it should be stopped.    You will be contacted by phone or email by a company called [employer]. This is an online educational platform that will provide education regarding your procedure. Please take the time to review this video.    Day before Procedure  Please follow any specific instructions given to you regarding any prep needed related to your procedure.    Day of Procedure  Take your normal morning medications with a sip of water first thing in the morning prior to surgery, except those medications your doctor or our Pre-Admission  Testing office has specifically told you not to take.    Other than a sip of water to wash down medications, DO NOT PUT ANYTHING IN YOUR MOUTH FOR AT LEAST FOUR HOURS PRIOR TO YOUR PROCEDURE.  This includes gum, candy, cigarettes, and chewing tobacco.       If you choose, for your convenience we have an outpatient pharmacy on-site and can arrange to have your discharge prescription filled before you leave, to save you a trip. You may want to bring enough money to cover the cost of this prescription.      Each patient that comes through the GI department is given individualized care and attention. The doctors and nurses spend the time necessary with each patient to ensure the best care. Sometimes, this can cause delays. You may want to bring a book, puzzle or music player to keep you busy if you experience a delay.       Procedures usually involve giving medications that put you to sleep or ease anxiety. For this reason, you must have a ride home from the hospital and someone (over age 16) should stay with you for the first 24 hours after your procedure. The procedure will be canceled if you do not have a responsible person with you. You are not allowed to drive after receiving sedation of any type.     Visitor Restrictions are currently in place due to COVID-19.  You can bring one designated adult (18 yr or older) with you to your surgery. Additional COVID related restrictions may apply.     Bring a photo ID, your insurance card and any copay due at time of service. Leave all valuables at home. Do not wear jewelry, makeup, body lotion, or fingernail polish.        If you have any questions or concerns before the day of your procedure, please call [phone] to have them answered.     After your procedure you may get a survey via email or text message. Please take the opportunity to fill it out letting us know what we did well or what we could improve on. Our goal is to provide our patients with the best possible  care and we couldn’t do it without your input.     We look forward to seeing you,  Your GI Services Team   40

## 2023-08-18 NOTE — ED PROVIDER NOTE - WR ORDER DATE AND TIME 1
[facility]: Preventive Care And Screening: Influenza Immunization: Influenza Immunization Administered during Influenza season
Detail Level: Detailed
05-Aug-2021 10:42

## 2024-01-08 NOTE — H&P PST ADULT - CARDIOVASCULAR
Regarding: [place] 48 yr old F Broken tooth and in a lot of pain  ----- Message from Daya Armendariz sent at 1/7/2024  6:42 PM CST -----  Patient Name: Ana Nair    Specialist or PCP Name: jonh olvera     Symptoms: Broken tooth and in a lot of pain    Pregnant (females aged 13-60. If Yes, how long?) : no    Call Back # : [phone]    Which State are you currently located in?: [place]     Name of Clinic Site / Acct# : [place]    Use following scripting for patients waiting for a callback:   \"Nurse callback times vary based on call volumes; please be aware the return phone call may come from an unidentified or out of state phone number. If your symptoms worsen or become life threatening while waiting, you should seek immediate assistance by calling 911 or going to the ER for evaluation.\"     Regular rate & rhythm, normal S1, S2; no murmurs, gallops or rubs; no S3, S4
